# Patient Record
Sex: FEMALE | Race: BLACK OR AFRICAN AMERICAN | URBAN - METROPOLITAN AREA
[De-identification: names, ages, dates, MRNs, and addresses within clinical notes are randomized per-mention and may not be internally consistent; named-entity substitution may affect disease eponyms.]

---

## 2022-04-19 ENCOUNTER — LAB OUTSIDE AN ENCOUNTER (OUTPATIENT)
Dept: URBAN - METROPOLITAN AREA CLINIC 17 | Facility: CLINIC | Age: 82
End: 2022-04-19

## 2022-04-19 ENCOUNTER — OFFICE VISIT (OUTPATIENT)
Dept: URBAN - METROPOLITAN AREA CLINIC 17 | Facility: CLINIC | Age: 82
End: 2022-04-19
Payer: MEDICARE

## 2022-04-19 DIAGNOSIS — K57.30 DIVERTICULA OF COLON: ICD-10-CM

## 2022-04-19 DIAGNOSIS — R10.13 DYSPEPSIA: ICD-10-CM

## 2022-04-19 DIAGNOSIS — I50.22 CHRONIC SYSTOLIC CONGESTIVE HEART FAILURE: ICD-10-CM

## 2022-04-19 DIAGNOSIS — Z86.010 PERSONAL HISTORY OF COLONIC POLYPS: ICD-10-CM

## 2022-04-19 DIAGNOSIS — N28.1 RENAL CYST: ICD-10-CM

## 2022-04-19 DIAGNOSIS — Z87.19 HISTORY OF IBS: ICD-10-CM

## 2022-04-19 DIAGNOSIS — Z80.0 FAMILY HISTORY OF GASTRIC CANCER: ICD-10-CM

## 2022-04-19 DIAGNOSIS — I10 ESSENTIAL HYPERTENSION: ICD-10-CM

## 2022-04-19 DIAGNOSIS — K83.8 DILATION OF BILIARY TRACT: ICD-10-CM

## 2022-04-19 PROCEDURE — 99214 OFFICE O/P EST MOD 30 MIN: CPT | Performed by: INTERNAL MEDICINE

## 2022-04-19 RX ORDER — AMLODIPINE BESYLATE 5 MG/1
1 TABLET TABLET ORAL ONCE A DAY
Status: ACTIVE | COMMUNITY

## 2022-04-19 RX ORDER — POTASSIUM CHLORIDE 1.5 G/1
POWDER, FOR SOLUTION ORAL
Qty: 0 | Refills: 0 | Status: DISCONTINUED | COMMUNITY
Start: 1900-01-01

## 2022-04-19 RX ORDER — ROSUVASTATIN CALCIUM 10 MG/1
1 TABLET TABLET, FILM COATED ORAL ONCE A DAY
Status: ACTIVE | COMMUNITY

## 2022-04-19 RX ORDER — FUROSEMIDE 40 MG/1
TABLET ORAL
Qty: 0 | Refills: 0 | Status: DISCONTINUED | COMMUNITY
Start: 1900-01-01

## 2022-04-19 RX ORDER — VALACYCLOVIR HYDROCHLORIDE 500 MG/1
TABLET, FILM COATED ORAL
Qty: 0 | Refills: 0 | Status: ACTIVE | COMMUNITY
Start: 1900-01-01

## 2022-04-19 RX ORDER — CARVEDILOL 25 MG/1
TABLET, FILM COATED ORAL
Qty: 0 | Refills: 0 | Status: ACTIVE | COMMUNITY
Start: 1900-01-01

## 2022-04-19 RX ORDER — LOSARTAN POTASSIUM 50 MG/1
TABLET ORAL
Qty: 0 | Refills: 0 | Status: ACTIVE | COMMUNITY
Start: 1900-01-01

## 2022-04-19 RX ORDER — FEXOFENADINE HYDROCHLORIDE 180 MG/1
TABLET, FILM COATED ORAL
Qty: 0 | Refills: 0 | Status: DISCONTINUED | COMMUNITY
Start: 1900-01-01

## 2022-04-19 NOTE — HPI-TODAY'S VISIT:
2019 78 yo lady pt of DR Sai Moffett. She had been having recurrent stomach problems and has had c-scope and eGD 10 yrs ago. She says "stomach flared up again in 5/2018" I have a h/o GERD, HH and IBS. She was placed on carafate. She continued to have 5 soft stools a day with stomach rumbling and gas. SHe thought it was stress related and then lost her son in 6/2018. She had a CT done earlier this month. After drinking CT contrast, BMs reduced in frequency but abd pain started after she stopped having mutliple stools. Now after a BM she has periumbilical discomfort, feels like a dull ache, and sometimes RLQ, lasts hours and then eventually subsides. 8/10. She has been having a BM once a day for the past 3 weeks. Post eating now she has a lot of burping and belching Her father and his sister both passed away from stomach CA. She is a breast CA survivor. She rarely uses nsaids.   5/22/19 Dicussed EGD and colonoscopy results. Says abd rumbling is not as bad and burping is improved. She has completed Diflucan. She took omeprazole for a while and then is now in Zantac. Stools have slowed down 'usually 2-3 a day' and more formed. No diarrhea, no runny stools. Omeprazole did not help with belching so she did not get a refill.   4/19/22 Says for the last 3 months, she can hear air in her stomach If she does not eat within 3-4 hours, she will have a gnawing sensation in her LUQ, then she feel air in her stomach and later a sensation in her large intestine like air. This is so annoying that she does not go out in crowds.  Symptoms started about 3-4 months ago.  No nausea or vomiting or dysphagia.  Has been taking motrin for flare up of fibromyalgia or arthritis.  She gets intermittent heartburn about once a week. No bearing on what time she eats.

## 2022-04-20 ENCOUNTER — TELEPHONE ENCOUNTER (OUTPATIENT)
Dept: URBAN - METROPOLITAN AREA CLINIC 92 | Facility: CLINIC | Age: 82
End: 2022-04-20

## 2022-04-20 RX ORDER — OMEPRAZOLE 40 MG/1
1 CAPSULE 30 MINUTES BEFORE MORNING MEAL CAPSULE, DELAYED RELEASE ORAL ONCE A DAY
Qty: 30 | Refills: 0 | OUTPATIENT
Start: 2022-04-20

## 2022-05-12 ENCOUNTER — OFFICE VISIT (OUTPATIENT)
Dept: URBAN - METROPOLITAN AREA MEDICAL CENTER 33 | Facility: MEDICAL CENTER | Age: 82
End: 2022-05-12
Payer: MEDICARE

## 2022-05-12 ENCOUNTER — TELEPHONE ENCOUNTER (OUTPATIENT)
Dept: URBAN - METROPOLITAN AREA CLINIC 92 | Facility: CLINIC | Age: 82
End: 2022-05-12

## 2022-05-12 DIAGNOSIS — K31.89 ACQUIRED DEFORMITY OF DUODENUM: ICD-10-CM

## 2022-05-12 DIAGNOSIS — B37.81 CANDIDA: ICD-10-CM

## 2022-05-12 PROCEDURE — 43239 EGD BIOPSY SINGLE/MULTIPLE: CPT | Performed by: INTERNAL MEDICINE

## 2022-05-12 RX ORDER — LOSARTAN POTASSIUM 50 MG/1
TABLET ORAL
Qty: 0 | Refills: 0 | Status: ACTIVE | COMMUNITY
Start: 1900-01-01

## 2022-05-12 RX ORDER — VALACYCLOVIR HYDROCHLORIDE 500 MG/1
TABLET, FILM COATED ORAL
Qty: 0 | Refills: 0 | Status: ACTIVE | COMMUNITY
Start: 1900-01-01

## 2022-05-12 RX ORDER — CARVEDILOL 25 MG/1
TABLET, FILM COATED ORAL
Qty: 0 | Refills: 0 | Status: ACTIVE | COMMUNITY
Start: 1900-01-01

## 2022-05-12 RX ORDER — ROSUVASTATIN CALCIUM 10 MG/1
1 TABLET TABLET, FILM COATED ORAL ONCE A DAY
Status: ACTIVE | COMMUNITY

## 2022-05-12 RX ORDER — OMEPRAZOLE 40 MG/1
1 CAPSULE 30 MINUTES BEFORE MORNING MEAL CAPSULE, DELAYED RELEASE ORAL ONCE A DAY
Qty: 90 | Refills: 1 | OUTPATIENT
Start: 2022-05-12

## 2022-05-12 RX ORDER — AMLODIPINE BESYLATE 5 MG/1
1 TABLET TABLET ORAL ONCE A DAY
Status: ACTIVE | COMMUNITY

## 2022-05-12 RX ORDER — FLUCONAZOLE 200 MG/1
1 TABLET TABLET ORAL
Qty: 14 | Refills: 0 | OUTPATIENT
Start: 2022-05-12 | End: 2022-05-26

## 2022-05-12 RX ORDER — OMEPRAZOLE 40 MG/1
1 CAPSULE 30 MINUTES BEFORE MORNING MEAL CAPSULE, DELAYED RELEASE ORAL ONCE A DAY
Qty: 30 | Refills: 0 | Status: ACTIVE | COMMUNITY
Start: 2022-04-20

## 2022-05-19 ENCOUNTER — P2P PATIENT RECORD (OUTPATIENT)
Age: 82
End: 2022-05-19

## 2022-08-08 ENCOUNTER — CLAIMS CREATED FROM THE CLAIM WINDOW (OUTPATIENT)
Dept: URBAN - METROPOLITAN AREA CLINIC 17 | Facility: CLINIC | Age: 82
End: 2022-08-08
Payer: MEDICARE

## 2022-08-08 VITALS
DIASTOLIC BLOOD PRESSURE: 75 MMHG | HEIGHT: 66 IN | BODY MASS INDEX: 24.85 KG/M2 | SYSTOLIC BLOOD PRESSURE: 126 MMHG | HEART RATE: 68 BPM | TEMPERATURE: 98.1 F | WEIGHT: 154.6 LBS

## 2022-08-08 DIAGNOSIS — Z87.19 HISTORY OF IBS: ICD-10-CM

## 2022-08-08 DIAGNOSIS — K21.00 GASTROESOPHAGEAL REFLUX DISEASE WITH ESOPHAGITIS WITHOUT HEMORRHAGE: ICD-10-CM

## 2022-08-08 DIAGNOSIS — I50.22 CHRONIC SYSTOLIC CONGESTIVE HEART FAILURE: ICD-10-CM

## 2022-08-08 DIAGNOSIS — K27.9 PUD (PEPTIC ULCER DISEASE): ICD-10-CM

## 2022-08-08 DIAGNOSIS — I10 ESSENTIAL HYPERTENSION: ICD-10-CM

## 2022-08-08 DIAGNOSIS — N28.1 RENAL CYST: ICD-10-CM

## 2022-08-08 DIAGNOSIS — K57.30 DIVERTICULA OF COLON: ICD-10-CM

## 2022-08-08 DIAGNOSIS — K83.8 DILATION OF BILIARY TRACT: ICD-10-CM

## 2022-08-08 DIAGNOSIS — Z80.0 FAMILY HISTORY OF GASTRIC CANCER: ICD-10-CM

## 2022-08-08 DIAGNOSIS — R10.13 DYSPEPSIA: ICD-10-CM

## 2022-08-08 DIAGNOSIS — Z86.010 PERSONAL HISTORY OF COLONIC POLYPS: ICD-10-CM

## 2022-08-08 PROCEDURE — 83013 H PYLORI (C-13) BREATH: CPT | Performed by: INTERNAL MEDICINE

## 2022-08-08 PROCEDURE — 83014 H PYLORI DRUG ADMIN: CPT | Performed by: INTERNAL MEDICINE

## 2022-08-08 PROCEDURE — 99213 OFFICE O/P EST LOW 20 MIN: CPT | Performed by: INTERNAL MEDICINE

## 2022-08-08 RX ORDER — POTASSIUM CHLORIDE 1.5 G/1.58G
1 PACKET WITH FOOD POWDER, FOR SOLUTION ORAL ONCE A DAY
Status: ACTIVE | COMMUNITY

## 2022-08-08 RX ORDER — LOSARTAN POTASSIUM 50 MG/1
TABLET ORAL
Qty: 0 | Refills: 0 | Status: ACTIVE | COMMUNITY
Start: 1900-01-01

## 2022-08-08 RX ORDER — VALACYCLOVIR HYDROCHLORIDE 500 MG/1
TABLET, FILM COATED ORAL
Qty: 0 | Refills: 0 | Status: ACTIVE | COMMUNITY
Start: 1900-01-01

## 2022-08-08 RX ORDER — OMEPRAZOLE 40 MG/1
1 CAPSULE 30 MINUTES BEFORE MORNING MEAL CAPSULE, DELAYED RELEASE ORAL ONCE A DAY
Qty: 90 | Refills: 1 | Status: ON HOLD | COMMUNITY
Start: 2022-05-12

## 2022-08-08 RX ORDER — FUROSEMIDE 40 MG/1
1 TABLET TABLET ORAL ONCE A DAY
Status: ACTIVE | COMMUNITY

## 2022-08-08 RX ORDER — ROSUVASTATIN CALCIUM 10 MG/1
1 TABLET TABLET, FILM COATED ORAL ONCE A DAY
Status: ACTIVE | COMMUNITY

## 2022-08-08 RX ORDER — OMEPRAZOLE 40 MG/1
1 CAPSULE 30 MINUTES BEFORE MORNING MEAL CAPSULE, DELAYED RELEASE ORAL ONCE A DAY
Qty: 30 | Refills: 0 | Status: ACTIVE | COMMUNITY
Start: 2022-04-20

## 2022-08-08 RX ORDER — CARVEDILOL 25 MG/1
TABLET, FILM COATED ORAL
Qty: 0 | Refills: 0 | Status: ACTIVE | COMMUNITY
Start: 1900-01-01

## 2022-08-08 RX ORDER — AMLODIPINE BESYLATE 5 MG/1
1 TABLET TABLET ORAL ONCE A DAY
Status: ACTIVE | COMMUNITY

## 2022-08-08 NOTE — HPI-TODAY'S VISIT:
2019 80 yo lady pt of DR Sai Moffett. She had been having recurrent stomach problems and has had c-scope and eGD 10 yrs ago. She says "stomach flared up again in 5/2018" I have a h/o GERD, HH and IBS. She was placed on carafate. She continued to have 5 soft stools a day with stomach rumbling and gas. SHe thought it was stress related and then lost her son in 6/2018. She had a CT done earlier this month. After drinking CT contrast, BMs reduced in frequency but abd pain started after she stopped having mutliple stools. Now after a BM she has periumbilical discomfort, feels like a dull ache, and sometimes RLQ, lasts hours and then eventually subsides. 8/10. She has been having a BM once a day for the past 3 weeks. Post eating now she has a lot of burping and belching Her father and his sister both passed away from stomach CA. She is a breast CA survivor. She rarely uses nsaids.   5/22/19 Dicussed EGD and colonoscopy results. Says abd rumbling is not as bad and burping is improved. She has completed Diflucan. She took omeprazole for a while and then is now in Zantac. Stools have slowed down 'usually 2-3 a day' and more formed. No diarrhea, no runny stools. Omeprazole did not help with belching so she did not get a refill.   4/19/22 Says for the last 3 months, she can hear air in her stomach If she does not eat within 3-4 hours, she will have a gnawing sensation in her LUQ, then she feel air in her stomach and later a sensation in her large intestine like air. This is so annoying that she does not go out in crowds.  Symptoms started about 3-4 months ago.  No nausea or vomiting or dysphagia.  Has been taking motrin for flare up of fibromyalgia or arthritis.  She gets intermittent heartburn about once a week. No bearing on what time she eats.  8/8/22 Here for f.u visit Abdomen no longer making loud noises "i dont want up at night". She is taking omeprazole daily.  No longer taking motrin. now uses tylenol extra strength

## 2022-08-09 LAB
ALBUMIN/GLOBULIN RATIO: 1.8
ALBUMIN: 4.1
ALKALINE PHOSPHATASE: 93
ALT (SGPT): 15
AST (SGOT): 21
BILIRUBIN, DIRECT: 0.1
BILIRUBIN, INDIRECT: 0.4
BILIRUBIN, TOTAL: 0.5
GLOBULIN: 2.3
PROTEIN, TOTAL: 6.4

## 2022-08-26 ENCOUNTER — TELEPHONE ENCOUNTER (OUTPATIENT)
Dept: URBAN - METROPOLITAN AREA CLINIC 17 | Facility: CLINIC | Age: 82
End: 2022-08-26

## 2022-09-06 ENCOUNTER — LAB OUTSIDE AN ENCOUNTER (OUTPATIENT)
Dept: URBAN - METROPOLITAN AREA CLINIC 105 | Facility: CLINIC | Age: 82
End: 2022-09-06

## 2022-09-07 LAB
H PYLORI BREATH TEST: NOT DETECTED
H. PYLORI BREATH TEST: NOT DETECTED
INTERPRETATION: NOT DETECTED

## 2022-09-29 ENCOUNTER — TELEPHONE ENCOUNTER (OUTPATIENT)
Dept: URBAN - METROPOLITAN AREA CLINIC 17 | Facility: CLINIC | Age: 82
End: 2022-09-29

## 2022-09-29 RX ORDER — OMEPRAZOLE 40 MG/1
1 CAPSULE 30 MINUTES BEFORE MORNING MEAL CAPSULE, DELAYED RELEASE ORAL ONCE A DAY
Qty: 90 | Refills: 0
Start: 2022-04-20

## 2023-02-06 ENCOUNTER — TELEPHONE ENCOUNTER (OUTPATIENT)
Dept: URBAN - METROPOLITAN AREA CLINIC 92 | Facility: CLINIC | Age: 83
End: 2023-02-06

## 2023-02-06 RX ORDER — OMEPRAZOLE 40 MG/1
1 CAPSULE 30 MINUTES BEFORE MORNING MEAL CAPSULE, DELAYED RELEASE ORAL ONCE A DAY
Qty: 90 | Refills: 0
Start: 2022-04-20

## 2023-02-08 ENCOUNTER — TELEPHONE ENCOUNTER (OUTPATIENT)
Dept: URBAN - METROPOLITAN AREA CLINIC 17 | Facility: CLINIC | Age: 83
End: 2023-02-08

## 2023-02-16 ENCOUNTER — CLAIMS CREATED FROM THE CLAIM WINDOW (OUTPATIENT)
Dept: URBAN - METROPOLITAN AREA CLINIC 17 | Facility: CLINIC | Age: 83
End: 2023-02-16
Payer: MEDICARE

## 2023-02-16 ENCOUNTER — LAB OUTSIDE AN ENCOUNTER (OUTPATIENT)
Dept: URBAN - METROPOLITAN AREA CLINIC 17 | Facility: CLINIC | Age: 83
End: 2023-02-16

## 2023-02-16 ENCOUNTER — DASHBOARD ENCOUNTERS (OUTPATIENT)
Age: 83
End: 2023-02-16

## 2023-02-16 VITALS
BODY MASS INDEX: 24.11 KG/M2 | TEMPERATURE: 97.2 F | DIASTOLIC BLOOD PRESSURE: 74 MMHG | WEIGHT: 150 LBS | HEIGHT: 66 IN | SYSTOLIC BLOOD PRESSURE: 122 MMHG | HEART RATE: 75 BPM

## 2023-02-16 DIAGNOSIS — R10.12 LUQ ABDOMINAL PAIN: ICD-10-CM

## 2023-02-16 DIAGNOSIS — Z80.0 FAMILY HISTORY OF GASTRIC CANCER: ICD-10-CM

## 2023-02-16 DIAGNOSIS — I10 ESSENTIAL HYPERTENSION: ICD-10-CM

## 2023-02-16 DIAGNOSIS — K83.8 DILATION OF BILIARY TRACT: ICD-10-CM

## 2023-02-16 DIAGNOSIS — K21.00 GASTROESOPHAGEAL REFLUX DISEASE WITH ESOPHAGITIS WITHOUT HEMORRHAGE: ICD-10-CM

## 2023-02-16 DIAGNOSIS — K57.30 DIVERTICULA OF COLON: ICD-10-CM

## 2023-02-16 DIAGNOSIS — K27.9 PUD (PEPTIC ULCER DISEASE): ICD-10-CM

## 2023-02-16 DIAGNOSIS — Z86.010 PERSONAL HISTORY OF COLONIC POLYPS: ICD-10-CM

## 2023-02-16 DIAGNOSIS — K27.3 ACUTE PEPTIC ULCER: ICD-10-CM

## 2023-02-16 DIAGNOSIS — N28.1 RENAL CYST: ICD-10-CM

## 2023-02-16 DIAGNOSIS — R10.13 DYSPEPSIA: ICD-10-CM

## 2023-02-16 DIAGNOSIS — Z87.19 HISTORY OF IBS: ICD-10-CM

## 2023-02-16 DIAGNOSIS — I50.22 CHRONIC SYSTOLIC CONGESTIVE HEART FAILURE: ICD-10-CM

## 2023-02-16 PROBLEM — 105997008: Status: ACTIVE | Noted: 2022-04-19

## 2023-02-16 PROBLEM — 733657002: Status: ACTIVE | Noted: 2022-04-19

## 2023-02-16 PROBLEM — 59621000: Status: ACTIVE | Noted: 2022-04-19

## 2023-02-16 PROBLEM — 266433003: Status: ACTIVE | Noted: 2022-08-08

## 2023-02-16 PROBLEM — 441481004: Status: ACTIVE | Noted: 2022-04-19

## 2023-02-16 PROBLEM — 428283002: Status: ACTIVE | Noted: 2022-04-19

## 2023-02-16 PROBLEM — 13200003: Status: ACTIVE | Noted: 2022-08-08

## 2023-02-16 PROCEDURE — 99214 OFFICE O/P EST MOD 30 MIN: CPT | Performed by: INTERNAL MEDICINE

## 2023-02-16 RX ORDER — FUROSEMIDE 40 MG/1
1 TABLET TABLET ORAL ONCE A DAY
Status: ACTIVE | COMMUNITY

## 2023-02-16 RX ORDER — AMLODIPINE BESYLATE 5 MG/1
1 TABLET TABLET ORAL ONCE A DAY
Status: ACTIVE | COMMUNITY

## 2023-02-16 RX ORDER — ROSUVASTATIN CALCIUM 10 MG/1
1 TABLET TABLET, FILM COATED ORAL ONCE A DAY
Status: ACTIVE | COMMUNITY

## 2023-02-16 RX ORDER — POTASSIUM CHLORIDE 1.5 G/1.58G
1 PACKET WITH FOOD POWDER, FOR SOLUTION ORAL ONCE A DAY
Status: ACTIVE | COMMUNITY

## 2023-02-16 RX ORDER — OMEPRAZOLE 40 MG/1
1 CAPSULE 30 MINUTES BEFORE MORNING MEAL CAPSULE, DELAYED RELEASE ORAL ONCE A DAY
Qty: 90 | Refills: 1 | Status: DISCONTINUED | COMMUNITY
Start: 2022-05-12

## 2023-02-16 RX ORDER — VALACYCLOVIR HYDROCHLORIDE 500 MG/1
TABLET, FILM COATED ORAL
Qty: 0 | Refills: 0 | Status: ACTIVE | COMMUNITY
Start: 1900-01-01

## 2023-02-16 RX ORDER — CARVEDILOL 25 MG/1
TABLET, FILM COATED ORAL
Qty: 0 | Refills: 0 | Status: ACTIVE | COMMUNITY
Start: 1900-01-01

## 2023-02-16 RX ORDER — OMEPRAZOLE 40 MG/1
1 CAPSULE 30 MINUTES BEFORE MORNING MEAL CAPSULE, DELAYED RELEASE ORAL ONCE A DAY
Qty: 90 | Refills: 0 | Status: ACTIVE | COMMUNITY
Start: 2022-04-20

## 2023-02-16 RX ORDER — LOSARTAN POTASSIUM 50 MG/1
TABLET ORAL
Qty: 0 | Refills: 0 | Status: ACTIVE | COMMUNITY
Start: 1900-01-01

## 2023-02-16 NOTE — HPI-TODAY'S VISIT:
78 yo lady pt of DR Sai Moffett. She had been having recurrent stomach problems and has had c-scope and eGD 10 yrs ago. She says "stomach flared up again in 2018" I have a h/o GERD, HH and IBS. She was placed on carafate. She continued to have 5 soft stools a day with stomach rumbling and gas. SHe thought it was stress related and then lost her son in 2018. She had a CT done earlier this month. After drinking CT contrast, BMs reduced in frequency but abd pain started after she stopped having mutliple stools. Now after a BM she has periumbilical discomfort, feels like a dull ache, and sometimes RLQ, lasts hours and then eventually subsides. 8/10. She has been having a BM once a day for the past 3 weeks. Post eating now she has a lot of burping and belching Her father and his sister both passed away from stomach CA. She is a breast CA survivor. She rarely uses nsaids.   19 Dicussed EGD and colonoscopy results. Says abd rumbling is not as bad and burping is improved. She has completed Diflucan. She took omeprazole for a while and then is now in Zantac. Stools have slowed down 'usually 2-3 a day' and more formed. No diarrhea, no runny stools. Omeprazole did not help with belching so she did not get a refill.   22 Says for the last 3 months, she can hear air in her stomach If she does not eat within 3-4 hours, she will have a gnawing sensation in her LUQ, then she feel air in her stomach and later a sensation in her large intestine like air. This is so annoying that she does not go out in crowds.  Symptoms started about 3-4 months ago.  No nausea or vomiting or dysphagia.  Has been taking motrin for flare up of fibromyalgia or arthritis.  She gets intermittent heartburn about once a week. No bearing on what time she eats.  22 Here for f.u visit Abdomen no longer making loud noises "i dont want up at night".   23 Here for f. u visit Says is having LUQ gnawing pain since she ran out of her PPI for 2 weeks. Has to to eat prunes to have a BM daily Is now back on it. her risk for PUD was Ibuprofen use. Is worried because her father  of gastric CA.

## 2023-04-13 ENCOUNTER — OFFICE VISIT (OUTPATIENT)
Dept: URBAN - METROPOLITAN AREA CLINIC 17 | Facility: CLINIC | Age: 83
End: 2023-04-13

## 2023-05-26 ENCOUNTER — TELEPHONE ENCOUNTER (OUTPATIENT)
Dept: URBAN - METROPOLITAN AREA CLINIC 17 | Facility: CLINIC | Age: 83
End: 2023-05-26

## 2023-05-26 RX ORDER — OMEPRAZOLE 40 MG/1
1 CAPSULE 30 MINUTES BEFORE MORNING MEAL CAPSULE, DELAYED RELEASE ORAL ONCE A DAY
Qty: 90 | Refills: 3
Start: 2022-04-20

## 2024-04-02 NOTE — EXAM-PHYSICAL EXAM
Addressed at facility--please see dictated note   Gen: Alert, oriented, in no distress Heent: no icterus, lips wnl Lungs: bilateral breath sounds CVS: first and second heart sounds Abdomen: full, soft, non tender Ext: no edema Joints: normal joints and symmetry Spine: consistent with age Skin: no rash on exposed areas Neuro: no focal localizing signs

## 2025-07-10 ENCOUNTER — OFFICE VISIT (OUTPATIENT)
Dept: URBAN - METROPOLITAN AREA CLINIC 17 | Facility: CLINIC | Age: 85
End: 2025-07-10
Payer: MEDICARE

## 2025-07-10 DIAGNOSIS — Z87.19 HISTORY OF IBS: ICD-10-CM

## 2025-07-10 DIAGNOSIS — I10 ESSENTIAL HYPERTENSION: ICD-10-CM

## 2025-07-10 DIAGNOSIS — I50.22 CHRONIC SYSTOLIC CONGESTIVE HEART FAILURE: ICD-10-CM

## 2025-07-10 DIAGNOSIS — K57.30 DIVERTICULA OF COLON: ICD-10-CM

## 2025-07-10 DIAGNOSIS — R10.13 DYSPEPSIA: ICD-10-CM

## 2025-07-10 DIAGNOSIS — R10.12 LUQ ABDOMINAL PAIN: ICD-10-CM

## 2025-07-10 DIAGNOSIS — K83.8 DILATION OF BILIARY TRACT: ICD-10-CM

## 2025-07-10 DIAGNOSIS — K59.09 CHRONIC CONSTIPATION: ICD-10-CM

## 2025-07-10 DIAGNOSIS — K27.9 PUD (PEPTIC ULCER DISEASE): ICD-10-CM

## 2025-07-10 DIAGNOSIS — Z86.0100 HISTORY OF COLON POLYPS: ICD-10-CM

## 2025-07-10 DIAGNOSIS — N28.1 RENAL CYST: ICD-10-CM

## 2025-07-10 DIAGNOSIS — Z80.0 FAMILY HISTORY OF GASTRIC CANCER: ICD-10-CM

## 2025-07-10 PROCEDURE — 99214 OFFICE O/P EST MOD 30 MIN: CPT | Performed by: INTERNAL MEDICINE

## 2025-07-10 RX ORDER — FUROSEMIDE 40 MG/1
1 TABLET TABLET ORAL ONCE A DAY
Status: ACTIVE | COMMUNITY

## 2025-07-10 RX ORDER — VALACYCLOVIR HYDROCHLORIDE 500 MG/1
TABLET, FILM COATED ORAL
Qty: 0 | Refills: 0 | Status: ACTIVE | COMMUNITY
Start: 1900-01-01

## 2025-07-10 RX ORDER — POTASSIUM CHLORIDE 1.5 G/1.58G
1 PACKET WITH FOOD POWDER, FOR SOLUTION ORAL ONCE A DAY
Status: ACTIVE | COMMUNITY

## 2025-07-10 RX ORDER — AMLODIPINE BESYLATE 5 MG/1
1 TABLET TABLET ORAL ONCE A DAY
Status: ACTIVE | COMMUNITY

## 2025-07-10 RX ORDER — CARVEDILOL 25 MG/1
TABLET, FILM COATED ORAL
Qty: 0 | Refills: 0 | Status: ACTIVE | COMMUNITY
Start: 1900-01-01

## 2025-07-10 RX ORDER — OMEPRAZOLE 40 MG/1
1 CAPSULE 30 MINUTES BEFORE MORNING MEAL CAPSULE, DELAYED RELEASE ORAL ONCE A DAY
Qty: 90 | Refills: 3 | Status: ACTIVE | COMMUNITY
Start: 2022-04-20

## 2025-07-10 RX ORDER — ROSUVASTATIN CALCIUM 10 MG/1
1 TABLET TABLET, FILM COATED ORAL ONCE A DAY
Status: ACTIVE | COMMUNITY

## 2025-07-10 RX ORDER — FOLIC ACID 1 MG/1
1 TABLET TABLET ORAL ONCE A DAY
Status: ACTIVE | COMMUNITY

## 2025-07-10 RX ORDER — LOSARTAN POTASSIUM 50 MG/1
TABLET ORAL
Qty: 0 | Refills: 0 | Status: ACTIVE | COMMUNITY
Start: 1900-01-01

## 2025-07-10 RX ORDER — OMEPRAZOLE 40 MG/1
1 CAPSULE 1/2 TO 1 HOUR BEFORE MORNING MEAL CAPSULE, DELAYED RELEASE ORAL ONCE A DAY
Qty: 90 | Refills: 3 | OUTPATIENT
Start: 2025-07-10

## 2025-07-10 NOTE — HPI-TODAY'S VISIT:
78 yo lady pt of DR Sai Moffett. She had been having recurrent stomach problems and has had c-scope and eGD 10 yrs ago. She says "stomach flared up again in 2018" I have a h/o GERD, HH and IBS. She was placed on carafate. She continued to have 5 soft stools a day with stomach rumbling and gas. SHe thought it was stress related and then lost her son in 2018. She had a CT done earlier this month. After drinking CT contrast, BMs reduced in frequency but abd pain started after she stopped having mutliple stools. Now after a BM she has periumbilical discomfort, feels like a dull ache, and sometimes RLQ, lasts hours and then eventually subsides. 8/10. She has been having a BM once a day for the past 3 weeks. Post eating now she has a lot of burping and belching Her father and his sister both passed away from stomach CA. She is a breast CA survivor. She rarely uses nsaids.   19 Dicussed EGD and colonoscopy results. Says abd rumbling is not as bad and burping is improved. She has completed Diflucan. She took omeprazole for a while and then is now in Zantac. Stools have slowed down 'usually 2-3 a day' and more formed. No diarrhea, no runny stools. Omeprazole did not help with belching so she did not get a refill.   22 Says for the last 3 months, she can hear air in her stomach If she does not eat within 3-4 hours, she will have a gnawing sensation in her LUQ, then she feel air in her stomach and later a sensation in her large intestine like air. This is so annoying that she does not go out in crowds.  Symptoms started about 3-4 months ago.  No nausea or vomiting or dysphagia.  Has been taking motrin for flare up of fibromyalgia or arthritis.  She gets intermittent heartburn about once a week. No bearing on what time she eats.  22 Here for f.u visit Abdomen no longer making loud noises "i dont want up at night".   23 Here for f. u visit Says is having LUQ gnawing pain since she ran out of her PPI for 2 weeks. Has to to eat prunes to have a BM daily Is now back on it. her risk for PUD was Ibuprofen use. Is worried because her father  of gastric CA.  7/10/25 Here for f/u visit concerned about LUQ point pain - off and on for up to a year like something is pushing in or rubbing on her rib On exam pain is c/w consistent with costochondritis.  No longer taking nsaids Occ HB about 1-2 times a month.  Stomach makes noises - daily  BMs. not completely evacuated Took PPI for many months last year diagnosed with G6PD deficiency and takes folic daily. says last year fell off a ladder and sprained her patella. did not need surgery